# Patient Record
Sex: MALE | Race: WHITE | HISPANIC OR LATINO | URBAN - METROPOLITAN AREA
[De-identification: names, ages, dates, MRNs, and addresses within clinical notes are randomized per-mention and may not be internally consistent; named-entity substitution may affect disease eponyms.]

---

## 2017-09-20 ENCOUNTER — EMERGENCY (EMERGENCY)
Facility: HOSPITAL | Age: 47
LOS: 1 days | Discharge: HOME | End: 2017-09-20
Attending: EMERGENCY MEDICINE
Payer: MEDICAID

## 2017-09-20 VITALS
OXYGEN SATURATION: 100 % | RESPIRATION RATE: 16 BRPM | WEIGHT: 164.91 LBS | HEART RATE: 62 BPM | HEIGHT: 73 IN | SYSTOLIC BLOOD PRESSURE: 131 MMHG | TEMPERATURE: 98 F | DIASTOLIC BLOOD PRESSURE: 74 MMHG

## 2017-09-20 VITALS
HEART RATE: 68 BPM | DIASTOLIC BLOOD PRESSURE: 72 MMHG | SYSTOLIC BLOOD PRESSURE: 117 MMHG | OXYGEN SATURATION: 100 % | RESPIRATION RATE: 16 BRPM | TEMPERATURE: 98 F
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
## 2017-09-20 DIAGNOSIS — R22.0 LOCALIZED SWELLING, MASS AND LUMP, HEAD: ICD-10-CM

## 2017-09-20 DIAGNOSIS — H57.11 OCULAR PAIN, RIGHT EYE: ICD-10-CM
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
## 2017-09-20 PROCEDURE — 99053 MED SERV 10PM-8AM 24 HR FAC: CPT

## 2017-09-20 PROCEDURE — 99283 EMERGENCY DEPT VISIT LOW MDM: CPT

## 2017-09-20 PROCEDURE — 99284 EMERGENCY DEPT VISIT MOD MDM: CPT | Mod: 25

## 2017-09-20 RX ORDER — ERYTHROMYCIN BASE 5 MG/GRAM
1 OINTMENT (GRAM) OPHTHALMIC (EYE)
Qty: 1 | Refills: 0 | OUTPATIENT
Start: 2017-09-20 | End: 2017-09-25

## 2017-09-20 RX ADMIN — Medication 1 DROP(S): at 07:18

## 2017-09-20 NOTE — ED PROVIDER NOTE - MEDICAL DECISION MAKING DETAILS
R eye pain, redness, eyelid swelling, concern for FB, none visualized, feeling better after irrigation

## 2017-09-20 NOTE — ED PROVIDER NOTE - OBJECTIVE STATEMENT
46 y/o M pt with no significant PMHx presents to ED c/o right eye pain x yesterday. Pt reports he was not wearing safety glasses while working with metal at work when something blew into his eye and since has right eye pain. Pt is concerned that there is a foreign body in his eye. Pt denies fever, chills, blurry vision, headache, eye discharge, eye swelling, or any other complaints. NKDA. 46 y/o M pt with no significant PMHx presents to ED c/o right eye pain x yesterday. 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 Pt is concerned that there is a foreign body in his eye. Mild eyelid swelling, no, vision change, Pt denies fever, chills, blurry vision, headache, eye discharge, or any other complaints. NKDA.

## 2017-09-20 NOTE — ED PROVIDER NOTE - EYES, MLM
Clear bilaterally, pupils equal, round and reactive to light. Clear bilaterally, pupils equal, round and reactive to light, sclera injected, no drainage, florescein negative, eyelid eversion negative, no FBs observed, EOMI

## 2017-09-20 NOTE — ED ADULT NURSE NOTE - OBJECTIVE STATEMENT
Patient complain of eye pain since yesterday. Patient complain of RT eye pain and swelling since yesterday. Something blew into his RT eye during work, either sheath rock or metal.